# Patient Record
Sex: FEMALE | Race: WHITE | Employment: UNEMPLOYED | URBAN - METROPOLITAN AREA
[De-identification: names, ages, dates, MRNs, and addresses within clinical notes are randomized per-mention and may not be internally consistent; named-entity substitution may affect disease eponyms.]

---

## 2019-12-12 ENCOUNTER — TELEPHONE (OUTPATIENT)
Dept: ENDOCRINOLOGY | Age: 84
End: 2019-12-12

## 2020-01-08 ENCOUNTER — TELEPHONE (OUTPATIENT)
Dept: ENDOCRINOLOGY | Age: 85
End: 2020-01-08

## 2020-02-17 RX ORDER — ATORVASTATIN CALCIUM 10 MG/1
10 TABLET, FILM COATED ORAL
COMMUNITY

## 2020-02-17 RX ORDER — LEVOTHYROXINE SODIUM 0.07 MG/1
TABLET ORAL
COMMUNITY
Start: 2019-12-09

## 2020-03-04 ENCOUNTER — TELEPHONE (OUTPATIENT)
Dept: ENDOCRINOLOGY | Age: 85
End: 2020-03-04

## 2020-03-05 ENCOUNTER — OFFICE VISIT (OUTPATIENT)
Dept: ENDOCRINOLOGY | Age: 85
End: 2020-03-05
Payer: MEDICARE

## 2020-03-05 VITALS
SYSTOLIC BLOOD PRESSURE: 136 MMHG | BODY MASS INDEX: 24.82 KG/M2 | DIASTOLIC BLOOD PRESSURE: 78 MMHG | HEIGHT: 64 IN | WEIGHT: 145.4 LBS

## 2020-03-05 PROBLEM — M81.0 OSTEOPOROSIS, POSTMENOPAUSAL: Status: ACTIVE | Noted: 2020-03-05

## 2020-03-05 PROCEDURE — G8420 CALC BMI NORM PARAMETERS: HCPCS | Performed by: INTERNAL MEDICINE

## 2020-03-05 PROCEDURE — 4040F PNEUMOC VAC/ADMIN/RCVD: CPT | Performed by: INTERNAL MEDICINE

## 2020-03-05 PROCEDURE — 99205 OFFICE O/P NEW HI 60 MIN: CPT | Performed by: INTERNAL MEDICINE

## 2020-03-05 PROCEDURE — 1036F TOBACCO NON-USER: CPT | Performed by: INTERNAL MEDICINE

## 2020-03-05 PROCEDURE — G8427 DOCREV CUR MEDS BY ELIG CLIN: HCPCS | Performed by: INTERNAL MEDICINE

## 2020-03-05 PROCEDURE — G8400 PT W/DXA NO RESULTS DOC: HCPCS | Performed by: INTERNAL MEDICINE

## 2020-03-05 PROCEDURE — 1123F ACP DISCUSS/DSCN MKR DOCD: CPT | Performed by: INTERNAL MEDICINE

## 2020-03-05 PROCEDURE — 1090F PRES/ABSN URINE INCON ASSESS: CPT | Performed by: INTERNAL MEDICINE

## 2020-03-05 PROCEDURE — G8484 FLU IMMUNIZE NO ADMIN: HCPCS | Performed by: INTERNAL MEDICINE

## 2020-03-05 RX ORDER — ALENDRONATE SODIUM 70 MG/1
70 TABLET ORAL WEEKLY
Qty: 12 TABLET | Refills: 4 | Status: SHIPPED | OUTPATIENT
Start: 2020-03-05 | End: 2021-03-08 | Stop reason: SDUPTHER

## 2020-03-05 RX ORDER — DESLORATADINE 5 MG/1
TABLET ORAL PRN
COMMUNITY
Start: 2016-09-21

## 2020-03-05 RX ORDER — OLOPATADINE HYDROCHLORIDE 2 MG/ML
SOLUTION/ DROPS OPHTHALMIC
COMMUNITY

## 2020-03-05 RX ORDER — MECLIZINE HCL 12.5 MG/1
TABLET ORAL 3 TIMES DAILY PRN
COMMUNITY
Start: 2018-06-25

## 2020-03-05 RX ORDER — CELECOXIB 100 MG/1
CAPSULE ORAL
COMMUNITY
Start: 2015-02-27

## 2020-03-05 RX ORDER — CALCIUM CARBONATE 500(1250)
1200 TABLET ORAL DAILY
COMMUNITY

## 2020-03-05 NOTE — PROGRESS NOTES
alcohol, caffeine or sodas. Lives with her . REVIEW OF SYSTEMS. Maximum adult height 67. 3 height loss. Usual weight 150#. No recent significant change in weight. PHYSICAL EXAMINATION. GENERAL. Well-nourished, well-developed, normally proportioned adult. MENTAL STATUS. Pleasant mood. Oriented to time, place, and person. ORAL. Teeth appear to be in good condition. SKIN. Normal texture and turgor. LUNGS. Clear to auscultation. Breath sounds normal.  HEART. Heart sounds normal, no murmur or gallop. MUSCULOSKELETAL. The examination included inspection/palpation (any misalignment, asymmetry, crepitation, defects, tenderness, masses, effusions is noted), assessment of range of motion (any presence of pain, crepitation, contracture is noted), assessment of stability (any dislocation, subluxation, laxity is noted), assessment of muscle strength and tone (any atrophy or abnormal movements is noted). Pelvis appears normal.  Kyphosis  No spine tenderness to palpation or percussion. One finger space between ribs and pelvis. Gait steady without assistance. NEUROLOGICAL. Not able to rise from chair without using arms. No apparent motor or sensory deficit. Coordination appears normal     BONE DENSITY. Most recent done at Dr. Blayne Cristobal equipment. The 2003 study was done at [facility]. The [id number] studies were done at [address] [place], [place] [address]y [address]. T-scores   Initial study: 08/21/2003 L1-L4 -0.2 left fem. neck -0.6   Current study: 08/28/2019 L1-L4 -1.1 left fem. neck -2.3     The table below shows bone mineral density (grams/cm2), the appropriate measure for comparing serial scans. A significant increase or decrease is based on precision studies done at our center according to the ISCD protocol with a least significant change of 0.030 g/cm2. PA spine Proximal Femur (left)   Date L1-L4  Fem.  neck Trochanter Total hip   08/21/2003 1.025 0.784 0.702 0.940   09/08/2005 1.018 0.730 0.672 0.920 09/28/2007 0.980 0.654 0.610 0.835   11/24/2008 1.016 --- --- 0.829   05/12/2011 0.974 0.738 0.640 0.877   08/28/2019 0.925 0.599 0.563 0.770     Labs: 07/2019 Ca 10.2 Cr 0.8 TSH. Imaging: DXA printouts reviewed. ASSESSMENT. Bone density low and decreasing faster than expected for age, race and sex. Without effective therapy, risk of fracture is high. DIAGNOSTIC PLANS. In 2 weeks (or longer), on appropriate calcium intake, 24-hour urine for calcium, creatinine and sodium. I will be in touch with the patient to review lab results. THERAPEUTIC PLANS. I recommended [facility]. Calcium, target 1200 mg daily; we discussed recommended calcium intake and the effects of excessive calcium intake from supplements. I provided a handout with information about how to calculate daily calcium intake. Advised to reduce calcium supplements. Vitamin D, current vitamin D intake is fine. Exercise, Recommended weight-bearing exercise (walking or equivalent) 30-40 minutes per session, 3 or 4 sessions a week and resistance exercise. Pharmacologic therapy, we discussed treatment options for osteoporosis; alendronate, Actonel, Atelvia, Reclast and Prolia. I explained dosing instructions, the mechanism of action, side effects and safety concerns which are rare. I recommended alendronate 70 mg weekly. Rx sent, dosing instructions reviewed. Return appointment with DXA in 1 year. I spent 60 minutes face to face with this patient. Over 50% of that time was spent on counseling and care coordination. See assessment and plan for counseling and care coordination details. Nicole Lee MD, Director, [facility]) Osteoporosis and Bone Health Services    CC: Brandy Brown MD

## 2020-03-05 NOTE — LETTER
[facility]  [facility] [address] [facility], [address] [place] [address],[place] [address]  Phone [phone]  Fax [phone]         2020         Nabila Trujillo MD                            Re:  Srinivas Moss,  1935    Dear Dr. Sandy Hanley: Thank you for asking me to see Srinivas Moss in consultation. As you know, Ms. Yasmeen Steinberg is a 82 y.o. found to have low bone density in 2003. BMD decreased 1765-1217, faster than expected for age, race and sex. 2019 T-scores were -1.1 in the spine, -2.3 in the left femoral neck. We reviewed life-style issues (calcium, vitamin D and physical activity). I have ordered some diagnostic tests and will be back in touch when I have the results. Using FRAX, her 10-year fracture risk high, above the recommended intervention thresholds, so pharmacologic therapy to reduce fracture risk is recommended. We discussed long-term treatment options. I recommended alendronate 70 mg weekly. Rx sent, dosing instructions reviewed. Enclosed is a copy of my consultation note. Please let me know if you have any questions. Sincerely,    Bhargav Lee MD     Encl.  Copy of consult note    CC: Margy Gould MD

## 2020-03-10 ENCOUNTER — TELEPHONE (OUTPATIENT)
Dept: ENDOCRINOLOGY | Age: 85
End: 2020-03-10

## 2020-03-10 NOTE — TELEPHONE ENCOUNTER
Patient is starting Alendronate.  Patient is doing the 24 hr urine next week and then will start the Alendronate

## 2020-03-18 DIAGNOSIS — M81.0 OSTEOPOROSIS, POSTMENOPAUSAL: ICD-10-CM

## 2020-03-18 LAB
24HR URINE VOLUME (ML): 2175 ML
CALCIUM 24 HOUR URINE: 100 MG/24 HR (ref 42–353)
CREATININE 24 HOUR URINE: 0.8 G/24HR (ref 0.6–1.5)
SODIUM 24 HOUR URINE: 85 MMOL/24 HR (ref 40–220)

## 2021-03-01 PROBLEM — Z51.81 MEDICATION MONITORING ENCOUNTER: Status: ACTIVE | Noted: 2021-03-01

## 2021-03-01 PROBLEM — M89.9 DISEASE OF SKELETAL SYSTEM: Status: ACTIVE | Noted: 2021-03-01

## 2021-03-08 ENCOUNTER — PROCEDURE VISIT (OUTPATIENT)
Dept: ENDOCRINOLOGY | Age: 86
End: 2021-03-08

## 2021-03-08 ENCOUNTER — HOSPITAL ENCOUNTER (OUTPATIENT)
Dept: GENERAL RADIOLOGY | Age: 86
Discharge: HOME | End: 2021-03-08
Payer: MEDICARE

## 2021-03-08 ENCOUNTER — OFFICE VISIT (OUTPATIENT)
Dept: ENDOCRINOLOGY | Age: 86
End: 2021-03-08
Payer: MEDICARE

## 2021-03-08 VITALS
DIASTOLIC BLOOD PRESSURE: 78 MMHG | SYSTOLIC BLOOD PRESSURE: 129 MMHG | WEIGHT: 147.8 LBS | BODY MASS INDEX: 25.23 KG/M2 | HEIGHT: 64 IN

## 2021-03-08 DIAGNOSIS — Z51.81 MEDICATION MONITORING ENCOUNTER: ICD-10-CM

## 2021-03-08 DIAGNOSIS — M89.9 DISEASE OF SKELETAL SYSTEM: Primary | ICD-10-CM

## 2021-03-08 DIAGNOSIS — M81.0 OSTEOPOROSIS, POSTMENOPAUSAL: ICD-10-CM

## 2021-03-08 DIAGNOSIS — M89.9 DISEASE OF SKELETAL SYSTEM: ICD-10-CM

## 2021-03-08 PROCEDURE — 77080 DXA BONE DENSITY AXIAL: CPT

## 2021-03-08 PROCEDURE — 77080 DXA BONE DENSITY AXIAL: CPT | Performed by: INTERNAL MEDICINE

## 2021-03-08 PROCEDURE — 4040F PNEUMOC VAC/ADMIN/RCVD: CPT | Performed by: INTERNAL MEDICINE

## 2021-03-08 PROCEDURE — G8399 PT W/DXA RESULTS DOCUMENT: HCPCS | Performed by: INTERNAL MEDICINE

## 2021-03-08 PROCEDURE — G8484 FLU IMMUNIZE NO ADMIN: HCPCS | Performed by: INTERNAL MEDICINE

## 2021-03-08 PROCEDURE — 99214 OFFICE O/P EST MOD 30 MIN: CPT | Performed by: INTERNAL MEDICINE

## 2021-03-08 PROCEDURE — 1123F ACP DISCUSS/DSCN MKR DOCD: CPT | Performed by: INTERNAL MEDICINE

## 2021-03-08 PROCEDURE — 1036F TOBACCO NON-USER: CPT | Performed by: INTERNAL MEDICINE

## 2021-03-08 PROCEDURE — G8427 DOCREV CUR MEDS BY ELIG CLIN: HCPCS | Performed by: INTERNAL MEDICINE

## 2021-03-08 PROCEDURE — 1090F PRES/ABSN URINE INCON ASSESS: CPT | Performed by: INTERNAL MEDICINE

## 2021-03-08 PROCEDURE — G8419 CALC BMI OUT NRM PARAM NOF/U: HCPCS | Performed by: INTERNAL MEDICINE

## 2021-03-08 RX ORDER — ALENDRONATE SODIUM 70 MG/1
70 TABLET ORAL WEEKLY
Qty: 12 TABLET | Refills: 4 | Status: SHIPPED | OUTPATIENT
Start: 2021-03-08 | End: 2022-02-07

## 2021-03-08 NOTE — RESULT ENCOUNTER NOTE
[facility]) Osteoporosis and [address] [facility] [address] Walker., [address] [address]   Phone [phone]  Fax [phone]    NAME: Wilver Andersen   : 1935   STUDY DATE: 2021     REFERRING PROVIDER: Alona Stanford MD    INDICATION(S) FOR PERFORMING THE STUDY:  osteoporosis, age related (M81.0)    CLINICAL INFORMATION PROVIDED BY THE PATIENT: 63-[id number] woman. No history of fragility fractures. No long-term corticosteroid use. Current treatment is alendronate started 2020. Family history of osteoporosis (father fractured hip). EQUIPMENT: [employer]. POSITIONING: Good. REGIONS OF INTEREST: Correct. ARTIFACTS: None. STUDY VALID? Yes. Spine BMD is spuriously high because of generalized degenerative change. No adjustments were made. T-scores   Initial study: 2003 L1-L4 -0.2 left fem. neck -0.6   Current study: 2021 L1-L4 -1.0 left fem. neck -2.1     The table below shows bone mineral density (grams/cm2), the appropriate measure for comparing serial scans. A significant increase or decrease is based on precision studies done at our center according to the ISCD protocol with a least significant change of 0.030 g/cm2. PA spine Proximal Femur (left)   Date L1-L4  Fem. neck Trochanter Total hip   2003 1.025 0.784 0.702 0.940   2005 1.018 0.730 0.672 0.920   2007 0.980 0.654 0.610 0.835   2008 1.016 --- --- 0.829   2011 0.974 0.738 0.640 0.877   2019 0.925 0.599 0.563 0.770   2021 0.942 0.614 0.577 0.803     IMPRESSION:  BONE DENSITY IS LOW. SINCE THE LAST DXA, BMD INCREASED IN THE TOTAL HIP AND IS TRENDING UP AT OTHER SITES. Consider repeating this study in 1-2 years to assess the patient's progress. _________________________________________________   Rebecca Lee MD, Director, [facility] ([facility]) Osteoporosis and [address] [facility]

## 2021-03-08 NOTE — PROGRESS NOTES
in good condition. MUSCULOSKELETAL. Kyphosis  No spine tenderness to palpation or percussion. One finger space between ribs and pelvis. Gait steady without assistance. NEUROLOGICAL. Not able to rise from chair without using arms. No apparent motor or sensory deficit. Coordination appears normal     BONE DENSITY. Most recent done here using H[employer] equipment. T-scores   Initial study: 08/21/2003 L1-L4 -0.2 left fem. neck -0.6   Current study: 03/08/2021 L1-L4 -1.0 left fem. neck -2.1     The table below shows bone mineral density (grams/cm2), the appropriate measure for comparing serial scans. A significant increase or decrease is based on precision studies done at our center according to the ISCD protocol with a least significant change of 0.030 g/cm2. PA spine Proximal Femur (left)   Date L1-L4  Fem. neck Trochanter Total hip   08/21/2003 1.025 0.784 0.702 0.940   09/08/2005 1.018 0.730 0.672 0.920   09/28/2007 0.980 0.654 0.610 0.835   11/24/2008 1.016 --- --- 0.829   05/12/2011 0.974 0.738 0.640 0.877   08/28/2019 0.925 0.599 0.563 0.770   03/08/2021 0.942 0.614 0.577 0.803     IMPRESSION:  BONE DENSITY IS LOW. SINCE THE LAST DXA, BMD INCREASED IN THE TOTAL HIP AND IS TRENDING UP AT OTHER SITES. Labs: 07/2019 Ca 10.2 Cr 0.8 TSH. 08/2020 Ca 9.8 Cr 0.8. Imaging: DXA printouts reviewed. ASSESSMENT. Bone density low and decreasing faster than expected for age, race and sex. Without effective therapy, risk of fracture is high. She is doing well with alendronate started 03/2020. PLANS. Continue alendronate 70 mg weekly. 03/2020 I recommended the [facility]. Return appointment with DXA in 1 year. Time spent today: 30-40 minutes. Sahara Lee MD, Director, [facility]  Osteoporosis and Bone Health Services    CC: Andre Bauer MD

## 2022-04-25 RX ORDER — ALENDRONATE SODIUM 70 MG/1
TABLET ORAL
Qty: 12 TABLET | Refills: 0 | OUTPATIENT
Start: 2022-04-25